# Patient Record
Sex: MALE | Race: WHITE | NOT HISPANIC OR LATINO | Employment: UNEMPLOYED | URBAN - METROPOLITAN AREA
[De-identification: names, ages, dates, MRNs, and addresses within clinical notes are randomized per-mention and may not be internally consistent; named-entity substitution may affect disease eponyms.]

---
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
## 2020-09-21 ENCOUNTER — TRANSFERRED RECORDS (OUTPATIENT)
Dept: HEALTH INFORMATION MANAGEMENT | Facility: CLINIC | Age: 20
End: 2020-09-21

## 2021-08-16 ENCOUNTER — TRANSFERRED RECORDS (OUTPATIENT)
Dept: HEALTH INFORMATION MANAGEMENT | Facility: CLINIC | Age: 21
End: 2021-08-16

## 2021-08-16 LAB
ALT SERPL-CCNC: 35 U/L (ref 0–55)
AST SERPL-CCNC: 26 U/L (ref 5–34)
CREATININE (EXTERNAL): 1.09 MG/DL (ref 0.57–1.11)
GLUCOSE (EXTERNAL): 91 MG/DL (ref 74–100)
HBA1C MFR BLD: 4.9 % (ref 4.2–6.3)

## 2022-09-19 ENCOUNTER — TRANSFERRED RECORDS (OUTPATIENT)
Dept: PULMONOLOGY | Facility: CLINIC | Age: 22
End: 2022-09-19

## 2022-09-22 ENCOUNTER — LAB (OUTPATIENT)
Dept: LAB | Facility: CLINIC | Age: 22
End: 2022-09-22
Payer: COMMERCIAL

## 2022-09-22 ENCOUNTER — CLINICAL UPDATE (OUTPATIENT)
Dept: PHARMACY | Facility: CLINIC | Age: 22
End: 2022-09-22
Payer: COMMERCIAL

## 2022-09-22 ENCOUNTER — OFFICE VISIT (OUTPATIENT)
Dept: PULMONOLOGY | Facility: CLINIC | Age: 22
End: 2022-09-22
Attending: PHYSICIAN ASSISTANT
Payer: COMMERCIAL

## 2022-09-22 VITALS
DIASTOLIC BLOOD PRESSURE: 75 MMHG | BODY MASS INDEX: 27.52 KG/M2 | WEIGHT: 192.24 LBS | OXYGEN SATURATION: 93 % | SYSTOLIC BLOOD PRESSURE: 128 MMHG | HEIGHT: 70 IN | HEART RATE: 61 BPM

## 2022-09-22 DIAGNOSIS — E84.9 CYSTIC FIBROSIS EXACERBATION (H): ICD-10-CM

## 2022-09-22 DIAGNOSIS — E84.9 CF (CYSTIC FIBROSIS) (H): Primary | ICD-10-CM

## 2022-09-22 DIAGNOSIS — E84.0 CYSTIC FIBROSIS OF THE LUNG (H): Primary | ICD-10-CM

## 2022-09-22 DIAGNOSIS — E84.9 CF (CYSTIC FIBROSIS) (H): ICD-10-CM

## 2022-09-22 LAB
ALBUMIN SERPL BCG-MCNC: 4.8 G/DL (ref 3.5–5.2)
ALP SERPL-CCNC: 100 U/L (ref 40–129)
ALT SERPL W P-5'-P-CCNC: 170 U/L (ref 10–50)
AST SERPL W P-5'-P-CCNC: 71 U/L (ref 10–50)
BILIRUB DIRECT SERPL-MCNC: 0.25 MG/DL (ref 0–0.3)
BILIRUB SERPL-MCNC: 0.9 MG/DL
CK SERPL-CCNC: 152 U/L (ref 39–308)
EXPTIME-PRE: 7.09 SEC
FEF2575-%PRED-PRE: 68 %
FEF2575-PRE: 3.43 L/SEC
FEF2575-PRED: 5.02 L/SEC
FEFMAX-%PRED-PRE: 104 %
FEFMAX-PRE: 10.56 L/SEC
FEFMAX-PRED: 10.06 L/SEC
FEV1-%PRED-PRE: 96 %
FEV1-PRE: 4.49 L
FEV1FEV6-PRE: 73 %
FEV1FEV6-PRED: 84 %
FEV1FVC-PRE: 73 %
FEV1FVC-PRED: 85 %
FIFMAX-PRE: 11.17 L/SEC
FVC-%PRED-PRE: 112 %
FVC-PRE: 6.17 L
FVC-PRED: 5.48 L
PROT SERPL-MCNC: 7.5 G/DL (ref 6.4–8.3)

## 2022-09-22 PROCEDURE — 80076 HEPATIC FUNCTION PANEL: CPT | Performed by: PATHOLOGY

## 2022-09-22 PROCEDURE — 87077 CULTURE AEROBIC IDENTIFY: CPT | Performed by: INTERNAL MEDICINE

## 2022-09-22 PROCEDURE — 99207 PR NO CHARGE LOS: CPT | Performed by: PHARMACIST

## 2022-09-22 PROCEDURE — 99204 OFFICE O/P NEW MOD 45 MIN: CPT | Mod: 25 | Performed by: INTERNAL MEDICINE

## 2022-09-22 PROCEDURE — U0005 INFEC AGEN DETEC AMPLI PROBE: HCPCS | Mod: 90 | Performed by: PATHOLOGY

## 2022-09-22 PROCEDURE — 99000 SPECIMEN HANDLING OFFICE-LAB: CPT | Performed by: PATHOLOGY

## 2022-09-22 PROCEDURE — 87206 SMEAR FLUORESCENT/ACID STAI: CPT | Performed by: INTERNAL MEDICINE

## 2022-09-22 PROCEDURE — 82550 ASSAY OF CK (CPK): CPT | Performed by: PATHOLOGY

## 2022-09-22 PROCEDURE — 94375 RESPIRATORY FLOW VOLUME LOOP: CPT | Performed by: INTERNAL MEDICINE

## 2022-09-22 PROCEDURE — 36415 COLL VENOUS BLD VENIPUNCTURE: CPT | Performed by: PATHOLOGY

## 2022-09-22 PROCEDURE — U0003 INFECTIOUS AGENT DETECTION BY NUCLEIC ACID (DNA OR RNA); SEVERE ACUTE RESPIRATORY SYNDROME CORONAVIRUS 2 (SARS-COV-2) (CORONAVIRUS DISEASE [COVID-19]), AMPLIFIED PROBE TECHNIQUE, MAKING USE OF HIGH THROUGHPUT TECHNOLOGIES AS DESCRIBED BY CMS-2020-01-R: HCPCS | Mod: 90 | Performed by: PATHOLOGY

## 2022-09-22 PROCEDURE — G0463 HOSPITAL OUTPT CLINIC VISIT: HCPCS | Mod: 25

## 2022-09-22 RX ORDER — ELEXACAFTOR, TEZACAFTOR, AND IVACAFTOR 100-50-75
KIT ORAL
COMMUNITY
Start: 2021-11-15 | End: 2023-12-29

## 2022-09-22 ASSESSMENT — PAIN SCALES - GENERAL: PAINLEVEL: NO PAIN (0)

## 2022-09-22 NOTE — PATIENT INSTRUCTIONS
Cystic Fibrosis Self-Care Plan    RECOMMENDATIONS:   Help us provide the best possible care. If you receive a questionnaire from the CF Foundation about your clinic experience today please fill it out.  It should take less than 5 minutes. Let us know what we are doing well and how we can improve.    We will check a sputum culture today and call you next week with antibiotics.  Labs today.  Once we review your labs we will order your Trikafta.  Continue vest therapy twice daily until you are feeling better then is okay to reduce to once daily.  Continue albuterol and Mucomyst.  Reduce alcohol to no more than 1 drink per day.  We will see you in 3 months with annual studies including labs, x-ray and glucose tolerance test.        [facility] Nurse line:  Kirk Cisneros  [phone]     [facility] Fax Number:      [phone]         Cystic Fibrosis Respiratory Therapists:   Raya Carter                          [phone]          Rossy Dodson   [phone]  Cystic Fibrosis Dietitians:              Tania Obregon              [phone]                            Estee Skinner                        [phone]   Cystic Fibrosis Diabetes Nurse:    Nessa Johnson               [phone]    Cystic Fibrosis Social Workers:     Joann Thompson               [phone]                     Enma Moore               [phone]  Cystic Fibrosis Pharmacists:           Soco Calderon                              [phone] (pager)         Krupa Maradiaga      [phone]   Cystic Fibrosis Genetic Counselor:   Smita Chou    [phone]    [facility] website:  [email or web address]    COVID VACCINES:    You are eligible for the COVID-19 vaccine. Sign up for your COVID vaccine via [employer]. Log in, select the menu bar, select schedule an appointment, and then select COVID-19 Vaccine 1st Dose. You may also schedule by calling this number [phone]  however hold times can be long.       OR schedule through the [facility] Vaccine Connector at https://vaccineconnector.[place].gov/ or by calling [phone].      The best vaccine is the one that s available to you first.  All COVID-19 vaccines currently available in the [place] (Tunde [employer] Tunde, Pfizer and Moderna) have been shown to be highly effect at preventing COVID-19.       We re still learning how vaccines will affect the spread of COVID-19. After you ve been fully vaccinated against COVID-19, you should keep taking precautions in public places like wearing a mask, staying 6 feet apart from others, and avoiding crowds and poorly ventilated spaces until we know more.       MRN: [id number]   Clinic Date: September 22, 2022   Patient: Andrea Mederos     Annual Studies:   IGG   Date Value Ref Range Status   10/28/2013 788 695 - 1,620 mg/dL Final     Insulin   Date Value Ref Range Status   10/28/2013 81 mU/L Final     Comment:     Reference Range:  0-20     There are no preventive care reminders to display for this patient.    Pulmonary Function Tests  FEV1: amount of air you can blow out in 1 second  FVC: total amount of air you can take in and blow out    Your Goals:         PFT Latest Ref Rng & Units 9/22/2022   FVC L 6.17   FEV1 L 4.49   FVC% % 112   FEV1% % 96          Airway Clearance: The Most Important Way to Keep Your Lungs Healthy  Vest Settings:   Hill-Rom Frequencies: 8, 9, 10 Pressure 10 Then, Frequencies 18, 19, 20 Pressure 6     RespirTech: Quick Start with Pressure of     Do each frequency for 5 minutes; Deflate vest after each frequency & cough 3 times before beginning the next setting.    Vest and Neb Therapy should be done 2 times/day.    Good Nutrition Can Improve Lung Function and Overall Health    Take ALL of your vitamins with food    Take 1/2 of your enzymes before EVERY meal/snack and the other 1/2 mid-meal/snack    Wt Readings from Last 3 Encounters:    09/22/22 87.2 kg (192 lb 3.9 oz)   04/15/14 50.4 kg (111 lb 1.8 oz) (59 %, Z= 0.22)*   12/06/13 47.6 kg (105 lb) (55 %, Z= 0.14)*     * Growth percentiles are based on [facility] (Boys, 2-20 Years) data.       Body mass index is 27.83 kg/m .         National CF Foundation Recommendations for BMI in CF Adults: Women: at least 22 Men: at least 23        Controlling Blood Sugars Helps Prevent Lung Infections & Improves Nutrition  Test blood sugar:    In the morning before eating (goal is )    2 hours after a meal (goal is less than 150)    When pre-meal glucose is greater than 150 add correction    At bedtime (if less than 100 eat a snack with 15 grams of carbohydrates  Last A1C Results:   Hemoglobin A1C   Date Value Ref Range Status   10/28/2013 5.2 4.3 - 6.0 % Final         If diabetic, measure A1C every 6 months. Goal: Under 7%    Staying Healthy  Research:  If you are interested in learning about research opportunities or have questions, please contact the CF Research Team at [phone] or [email or web address].    CF Foundation:  Compass is a personalized resource service to help you with the insurance, financial, legal and other issues you are facing.  It's free, confidential and available to anyone with CF.  Ask your  for more information or contact Compass directly at [phone]-[phone]) or compass@cff.org, or learn more at cff.org/compass.

## 2022-09-22 NOTE — NURSING NOTE
"Andrea Mederos is a 21 year old year old who is being seen for Cystic Fibrosis (New CF )      Medications reviewed and Vital signs taken.    Specimen Collection Type: Sputum    Order(s) placed: AFB (Acid Fast Bacilli) and CF Aerobic Bacterial    *IF AFB order placed - please enter \"PRIORITIZE AFB\" to order comments.       No results found for: ACIDFAST      No results found for: AFBSMS                Vitals were taken and medications were reconciled.     Beatriz Meade RMA  1:56 PM          "

## 2022-09-22 NOTE — LETTER
9/22/2022         RE: Andrea Mederos  [address] [place] [address]        Dear Colleague,    Thank you for referring your patient, Andrea Mederos, to the [facility] FOR LUNG SCIENCE AND [facility]. Please see a copy of my visit note below.    [facility]   CF Clinic New Patient     Andrea Mederos MRN: [id number]  Date of Service: 09/22/2022    HPI:  Andrea Mederos is a 21M with CF here to establish care. Transferring from: [facility]. Previously seen by Dr. Rajni Camarena, last clinic visit 8/16/21.     Today:   - URI symptoms 3 weeks ago. Cough, sneezing, rhinorrhea, sinus pressure, malaise, myalgia. No fevers or chills. Sx improving but still with white-yellow productive cough - worst in the morning and improves in the afternoon. Increased vest therapy to BID during this period. Also with belly pain and diarrhea but now resolved. +Sick contact (friend) with similar symptoms.    - Covid 19 winter 2021. Did not require hospitalization. Had temperatures of 104F. Main sx myalgia and HA, no difficult breathing.     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 Stools vary from 1 to 3x/day, can be foremd or loose depending on what he eats (greasy foods, beer). Good appetite.   Activity: Walk, bikes 5-10 miles couple times a week    Maintenance   Modulator: Trikafta started 3/2020. Has improved FEV1 and weight. Stopped 6/2021 because ran out of refills, did not notice any difference off the medication. Was restarted  "8/2021, self ND'ed 1 month ago (running low/almost out). Poor compliance, 4-5 times weekly.   AW Clearance: Vest therapy 1x/day. Sometimes brings up phlegm  Bronchodilators: Albuterol neb 1x/day with vest therapy + QID prn   Mucolytics: None  Antibiotics Inh: None currently. Previously on UMAIR as a teen every other month   Antibiotics Oral: None  Vitamins: Infrequently      ROS: Denies fevers, chills, CP, SOB, abdominal pain, HA. 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   - Tobacco: Vapes THC and nicotine >10 times daily   - EtOH: 4 drinks daily   - Illicits: Smokes cannabis     Outpatient Medications: All listed under maintenance as above. No antihistamines or OTC vitamins.     Allergies:   Seasonal     Family Hx:   Cancer maternal side   Brother has CF      Physical Exam:   /75 (BP Location: Right arm, Patient Position: Chair, Cuff Size: Adult Regular)   Pulse 61   Ht 1.77 m (5' 9.69\")   Wt 87.2 kg (192 lb 3.9 oz)   SpO2 93%   BMI 27.83 kg/m    - Gen: Aox3, NAD   - HEENT: No LN. TM opaque. 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 Overall healthy, no h/o frequent CF exacerbations, " hospitalizations or need for abx (last time ~7 years ago). Was previously on UMAIR nebs as a teenager but not longer needed  - Symptoms minimal. At baseline no productive cough or sinus issues. Currently with some lingering post viral URI symptoms, notably productive cough and malaise. Pt states the cold is lasting longer than normal and would like some abx    - Check Covid PCR    - Sputum culture collected. Will wait for speciation prior to starting abx   - Poor compliance with Trikafta. Started in 3/2020 however takes inconsistently (couple of times a week) with stretches of time off the medication. Pt doesn't notice a difference off the medication. Reiterated importance of medication adherence and long term benefits. Will resume Trikafta pending LFT's   - FEV1 slightly improved compared to 8/2021, no pulmonary concerns   - Vest therapy daily + Albuterol nebs daily. Currently not on inhaled abx or mucolytic    # EtOH and Tobacco Use  - Counseled patient on cutting back      # Maintenance   - Immunization: Up to date including Covid vaccine. Needs annual flu shot   - Annual labs + CXR + Dexa next visit     RTC 3 months     Patient seen and discussed with Dr. Eleanor Dunn MD   Pulmonary and Critical Care PGY4        Attestation signed by Rock Webster MD at 9/25/2022 11:52 AM:  Attending attestation: I, Rock Webster M.D., have seen and examined the patient with Dr. Eli Dunn MD . I have reviewed labs and radiographs. We have discussed the patient and I agree with the findings and plan of care as discussed below.    Andrea Mederos is a 21-year-old with cystic fibrosis with mild obstructive lung disease.  This is his first visit to the adult CF program with [facility].  He was previously cared for at [facility] but his last visit was more than a year ago.  He was originally diagnosed at age 5, evaluated due to GI symptoms.  He has been hospitalized  only once.  He has rare exacerbations, last required antibiotics many years ago.  Previous cultures with MSSA and Pseudomonas.  He has pancreatic sufficient and has a history of CF related sinus disease.  Breathing is comfortable at rest and with all usual activity.  He bikes daily.  Generally rare cough with minimal sputum but has had increased cough for the past couple weeks productive of white-yellow sputum.  Also has noted some body aches and sinus pain.  He had rhinorrhea but it is improving.  He was having difficulty sleeping and headache but this is resolved.  No fever, chills or night sweats.  Body aches have resolved.  Vest therapy once or twice daily, generally twice.  He did have some nausea and vomiting due to increased cough and sputum last week.  This has since resolved.  He started Trikafta in March 2020 but has not had it for least the past month.  He did have a COVID infection last winter.  Nasal mucosal edema.  Lungs are clear with good airflow, no crackles, wheezes or rhonchi.  Heart sounds are normal.  Abdomen is soft and nontender.      Recent Results (from the past 168 hour(s))   General PFT Lab (Please always keep checked)    Collection Time: 09/22/22  1:30 PM   Result Value Ref Range    FVC-Pred 5.48 L    FVC-Pre 6.17 L    FVC-%Pred-Pre 112 %    FEV1-Pre 4.49 L    FEV1-%Pred-Pre 96 %    FEV1FVC-Pred 85 %    FEV1FVC-Pre 73 %    FEFMax-Pred 10.06 L/sec    FEFMax-Pre 10.56 L/sec    FEFMax-%Pred-Pre 104 %    FEF2575-Pred 5.02 L/sec    FEF2575-Pre 3.43 L/sec    BUO1560-%Pred-Pre 68 %    ExpTime-Pre 7.09 sec    FIFMax-Pre 11.17 L/sec    FEV1FEV6-Pred 84 %    FEV1FEV6-Pre 73 %   Cystic Fibrosis Culture Aerobic Bacterial    Collection Time: 09/22/22  3:36 PM    Specimen: Expectorate; Sputum   Result Value Ref Range    Culture Culture in progress     Culture 4+ Normal kit     Culture 2+ Staphylococcus aureus (A)     Culture 1+ Klebsiella pneumoniae (A)    Acid-Fast Bacilli Culture and Stain     Collection Time: 09/22/22  3:37 PM    Specimen: Expectorate; Sputum   Result Value Ref Range    Acid Fast Stain No acid fast bacilli seen    Hepatic function panel    Collection Time: 09/22/22  4:04 PM   Result Value Ref Range    Protein Total 7.5 6.4 - 8.3 g/dL    Albumin 4.8 3.5 - 5.2 g/dL    Bilirubin Total 0.9 <=1.2 mg/dL    Alkaline Phosphatase 100 40 - 129 U/L    AST 71 (H) 10 - 50 U/L     (H) 10 - 50 U/L    Bilirubin Direct 0.25 0.00 - 0.30 mg/dL   CK total    Collection Time: 09/22/22  4:04 PM   Result Value Ref Range     39 - 308 U/L   Symptomatic; Yes; 9/1/2022 COVID-19 Virus (Coronavirus) by PCR Nasopharyngeal    Collection Time: 09/22/22  4:50 PM    Specimen: Nasopharyngeal; Swab   Result Value Ref Range    SARS CoV2 PCR Negative Negative       Results as noted above.    PFT Interpretation:  Very mld obstructive ventilatory defect.  No previous studies for comparison at this  institution.  Valid Maneuver    Pulmonary exacerbation: mild: Increased vest/bronchodilator/execise. Antibiotic TBD based on culture results.    Andrea Mederos is a 21-year-old with cystic fibrosis with mild lung disease.  This is his first visit to the adult CF program diversity [place].  He presents with persistent cough and sputum production after a recent probable viral URI.  Symptoms suggest a mild CF pulmonary exacerbation following a viral illness.  No recent cultures are available.  Sputum culture will be obtained today and antibiotics will be initiated based on the culture results.  Patient was encouraged to do vest therapy twice daily on a consistent basis.  With the ongoing fatigue, COVID will be checked.  The patient was previously on Trikafta.  He has not had any for the past month.  LFTs and CK will be checked to determine safety of reinitiating Trikafta.  Patient reports drinking 4 alcoholic beverages per day.  He was encouraged to cut down to 1 or less per day to limit hepatic injury to permit the  use of Trikafta.  The patient is also vaping and he was encouraged to reduce or quit this as well.  This will be addressed with ongoing visits.  Follow-up in 3 months with full annual studies.  Rock Webster MD       Again, thank you for allowing me to participate in the care of your patient.        Sincerely,        Eli Dunn MD

## 2022-09-22 NOTE — PROGRESS NOTES
[facility]   CF Clinic New Patient     Andrea Mederos MRN: [id number]  Date of Service: 09/22/2022    HPI:  Andrea Mederos is a 21M with CF here to establish care. Transferring from: [facility]. Previously seen by Dr. Rajni Camarena, last clinic visit 8/16/21.     Today:   - URI symptoms 3 weeks ago. Cough, sneezing, rhinorrhea, sinus pressure, malaise, myalgia. No fevers or chills. Sx improving but still with white-yellow productive cough - worst in the morning and improves in the afternoon. Increased vest therapy to BID during this period. Also with belly pain and diarrhea but now resolved. +Sick contact (friend) with similar symptoms.    - Covid 19 winter 2021. Did not require hospitalization. Had temperatures of 104F. Main sx myalgia and HA, no difficult breathing.     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 Stools vary from 1 to 3x/day, can be foremd or loose depending on what he eats (greasy foods, beer). Good appetite.   Activity: Walk, bikes 5-10 miles couple times a week    Maintenance   Modulator: Trikafta started 3/2020. Has improved FEV1 and weight. Stopped 6/2021 because ran out of refills, did not notice any difference off the medication. Was restarted 8/2021, self dc'ed 1 month ago (running low/almost out). Poor compliance, 4-5 times weekly.   AW Clearance: Vest therapy 1x/day. Sometimes brings up phlegm  Bronchodilators: Albuterol neb 1x/day with vest therapy + QID prn   Mucolytics: None  Antibiotics Inh: None currently. Previously on  "UMAIR as a teen every other month   Antibiotics Oral: None  Vitamins: Infrequently      ROS: Denies fevers, chills, CP, SOB, abdominal pain, HA. 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   - Tobacco: Vapes THC and nicotine >10 times daily   - EtOH: 4 drinks daily   - Illicits: Smokes cannabis     Outpatient Medications: All listed under maintenance as above. No antihistamines or OTC vitamins.     Allergies:   Seasonal     Family Hx:   Cancer maternal side   Brother has CF      Physical Exam:   /75 (BP Location: Right arm, Patient Position: Chair, Cuff Size: Adult Regular)   Pulse 61   Ht 1.77 m (5' 9.69\")   Wt 87.2 kg (192 lb 3.9 oz)   SpO2 93%   BMI 27.83 kg/m    - Gen: Aox3, NAD   - HEENT: No LN. TM opaque. 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 Overall healthy, no h/o frequent CF exacerbations, hospitalizations or need for abx (last time ~7 years ago). Was previously on UMAIR nebs as a teenager but not longer needed  - Symptoms minimal. At baseline no productive cough or sinus issues. Currently with some lingering post viral URI symptoms, notably productive cough and malaise. Pt states the " cold is lasting longer than normal and would like some abx    - Check Covid PCR    - Sputum culture collected. Will wait for speciation prior to starting abx   - Poor compliance with Trikafta. Started in 3/2020 however takes inconsistently (couple of times a week) with stretches of time off the medication. Pt doesn't notice a difference off the medication. Reiterated importance of medication adherence and long term benefits. Will resume Trikafta pending LFT's   - FEV1 slightly improved compared to 8/2021, no pulmonary concerns   - Vest therapy daily + Albuterol nebs daily. Currently not on inhaled abx or mucolytic    # EtOH and Tobacco Use  - Counseled patient on cutting back      # Maintenance   - Immunization: Up to date including Covid vaccine. Needs annual flu shot   - Annual labs + CXR + Dexa next visit     RTC 3 months     Patient seen and discussed with Dr. Eleanor Dunn MD   Pulmonary and Critical Care PGY4

## 2022-09-22 NOTE — PROGRESS NOTES
Clinical Update:                                                    At the request of Dr. Webster, a chart review was conducted for Andrea Mederos.    Reason for Chart Review: Trikafta Lab Monitoring     Discussion: Andrea has been on Trikafta since 3/2020. Andrea has been off of Trikafta for the past month. He also report increased alcohol use (~4 drinks per day) which has prompted additional monitoring.    Labs were reviewed from 9/22/2022 at [facility] . ALT is elevated at 3.4 x the upper limit of normal and AST is elevated at 1.4 x the upper limit of normal. All other labs are within normal limits.    Lab Results   Component Value Date     (H) 09/22/2022    AST 71 (H) 09/22/2022    BILITOTAL 0.9 09/22/2022    DBIL 0.25 09/22/2022     09/22/2022         Plan:  1. Will continue hold on Trikafta due to LFT elevation . Unable to reach Andrea, left message  2. Recheck hepatic panel and CK in 2 weeks        Krupa Maradiaga, PharmD  Cystic Fibrosis MTM Pharmacist  [facility]  Voicemail: [phone]

## 2022-09-23 DIAGNOSIS — E84.0 CYSTIC FIBROSIS WITH PULMONARY MANIFESTATIONS (H): Primary | ICD-10-CM

## 2022-09-23 LAB — SARS-COV-2 RNA RESP QL NAA+PROBE: NEGATIVE

## 2022-09-29 ENCOUNTER — TELEPHONE (OUTPATIENT)
Dept: PULMONOLOGY | Facility: CLINIC | Age: 22
End: 2022-09-29

## 2022-09-29 DIAGNOSIS — E84.0 CYSTIC FIBROSIS WITH PULMONARY MANIFESTATIONS (H): Primary | ICD-10-CM

## 2022-09-29 DIAGNOSIS — E84.9 CF (CYSTIC FIBROSIS) (H): ICD-10-CM

## 2022-09-29 DIAGNOSIS — E84.9 CF (CYSTIC FIBROSIS) (H): Primary | ICD-10-CM

## 2022-09-29 RX ORDER — SULFAMETHOXAZOLE/TRIMETHOPRIM 800-160 MG
2 TABLET ORAL 2 TIMES DAILY
Qty: 84 TABLET | Refills: 0 | Status: SHIPPED | OUTPATIENT
Start: 2022-09-29 | End: 2022-10-20

## 2022-09-30 NOTE — TELEPHONE ENCOUNTER
Patient seen in clinic last week. Respiratory symptoms follow URI. Plan was to review what organisms grew and treat. Patient grew MSSA and Klebsiella pneumoniae.    Reviewed with Dr. Webster:    Plan to treat with Bactrim DS 2 tabs BID x 21 days  Stay well hydrated and take with food.  Continue to increase vesting to BID  Call if symptoms don't improve or worsen  RN added sensitivities on for Klebsiella pneumoniae    JUAN R Roman

## 2022-10-02 LAB
BACTERIA SPT CULT: ABNORMAL

## 2022-10-04 ENCOUNTER — TELEPHONE (OUTPATIENT)
Dept: PHARMACY | Facility: CLINIC | Age: 22
End: 2022-10-04

## 2022-10-04 NOTE — TELEPHONE ENCOUNTER
Second call placed to Andrea, recommend repeat labs in order to restart Trikafta. Elevated at last visit. Requested callback to review how to schedule lab only appointment.    Left message.    Krupa Maradiaga, PharmD  Cystic Fibrosis MTM Pharmacist  [facility]  Voicemail: [phone]

## 2022-11-11 DIAGNOSIS — E84.9 CF (CYSTIC FIBROSIS) (H): Primary | ICD-10-CM

## 2022-11-11 RX ORDER — SODIUM CHLORIDE FOR INHALATION 0.9 %
3 VIAL, NEBULIZER (ML) INHALATION 2 TIMES DAILY
Qty: 180 ML | Refills: 3 | Status: SHIPPED | OUTPATIENT
Start: 2022-11-11 | End: 2023-01-10

## 2022-11-11 NOTE — PROGRESS NOTES
Father in clinic. Reports patient is needing refill of Sodium Chloride (same as brother). Per chart review, brother not prescribed hypertonic. Father reports patient uses nebulizer twice daily. Reviewed with RT Rossy and sent prescription to [facility] per father's request.    JUAN R Roman

## 2022-11-18 LAB
ACID FAST STAIN (ARUP): NORMAL
ACID FAST STAIN (ARUP): NORMAL

## 2023-01-10 DIAGNOSIS — E84.9 CF (CYSTIC FIBROSIS) (H): ICD-10-CM

## 2023-01-10 RX ORDER — ALBUTEROL SULFATE 0.83 MG/ML
SOLUTION RESPIRATORY (INHALATION)
Qty: 540 ML | Refills: 3 | Status: SHIPPED | OUTPATIENT
Start: 2023-01-10 | End: 2023-10-23

## 2023-01-10 RX ORDER — SODIUM CHLORIDE FOR INHALATION 0.9 %
3 VIAL, NEBULIZER (ML) INHALATION 2 TIMES DAILY
Qty: 180 ML | Refills: 3 | Status: SHIPPED | OUTPATIENT
Start: 2023-01-10 | End: 2023-01-17

## 2023-01-17 DIAGNOSIS — E84.9 CF (CYSTIC FIBROSIS) (H): ICD-10-CM

## 2023-01-17 RX ORDER — SODIUM CHLORIDE FOR INHALATION 0.9 %
VIAL, NEBULIZER (ML) INHALATION
Qty: 600 ML | Refills: 1 | Status: SHIPPED | OUTPATIENT
Start: 2023-01-17 | End: 2023-10-23

## 2023-04-10 ENCOUNTER — TELEPHONE (OUTPATIENT)
Dept: PULMONOLOGY | Facility: CLINIC | Age: 23
End: 2023-04-10
Payer: COMMERCIAL

## 2023-04-10 NOTE — TELEPHONE ENCOUNTER
Contacted patient to assist with schedule appointment as patient has been lost to follow up. Patient reported that he started a new job and is unsure when he will be able to come in to clinic. Provided patient with phone number for . Instructed patient to call to schedule. Patient reported he is out of trikafta and requested a refill. Notified patient that he will need to schedule an appointment to get Trikafta refill. Patient verbalized understanding and stated he would call  to set up appointment.    Sophie Robles RN

## 2023-10-23 ENCOUNTER — TELEPHONE (OUTPATIENT)
Dept: PULMONOLOGY | Facility: CLINIC | Age: 23
End: 2023-10-23
Payer: COMMERCIAL

## 2023-10-23 DIAGNOSIS — E84.9 CF (CYSTIC FIBROSIS) (H): ICD-10-CM

## 2023-10-23 RX ORDER — SODIUM CHLORIDE FOR INHALATION 0.9 %
VIAL, NEBULIZER (ML) INHALATION
Qty: 180 ML | Refills: 0 | Status: SHIPPED | OUTPATIENT
Start: 2023-10-23 | End: 2023-10-26

## 2023-10-23 RX ORDER — ALBUTEROL SULFATE 0.83 MG/ML
SOLUTION RESPIRATORY (INHALATION)
Qty: 540 ML | Refills: 3 | Status: SHIPPED | OUTPATIENT
Start: 2023-10-23 | End: 2023-10-26

## 2023-10-23 NOTE — TELEPHONE ENCOUNTER
Mom called requesting prescription refill for nebs. Notified mom that patient has not been seen in over a year, needs an appointment to be able to provide bridge until appointment. Appt scheduled for 11/10, one month supply sent. More refills to be sent following appointment.     Sophie Robles RN

## 2023-10-25 ENCOUNTER — TELEPHONE (OUTPATIENT)
Dept: PULMONOLOGY | Facility: CLINIC | Age: 23
End: 2023-10-25
Payer: COMMERCIAL

## 2023-10-25 NOTE — TELEPHONE ENCOUNTER
[facility] Call Center    Phone Message    May a detailed message be left on voicemail: yes     Reason for Call: Medication Question or concern regarding medication   Prescription Clarification  Name of Medication: sodium chloride 0.9 % neb solution   Prescribing Provider:    Pharmacy: [facility] - [place], [address]    What on the order needs clarification?     Per pharmacy would like to know what size vial for medication. Please call back at   [phone] REF# [id number]    Action Taken: Message routed to:  [facility] ([facility]): PULM    Travel Screening: Not Applicable

## 2023-10-25 NOTE — TELEPHONE ENCOUNTER
RN returned call to pharmacy to confirm size of vial (3mL). 30 day supply until patient is seen for scheduled visit.     JUAN R Roman

## 2023-10-26 RX ORDER — ALBUTEROL SULFATE 0.83 MG/ML
SOLUTION RESPIRATORY (INHALATION)
Qty: 540 ML | Refills: 3 | Status: SHIPPED | OUTPATIENT
Start: 2023-10-26

## 2023-10-26 RX ORDER — SODIUM CHLORIDE FOR INHALATION 0.9 %
VIAL, NEBULIZER (ML) INHALATION
Qty: 180 ML | Refills: 0 | Status: SHIPPED | OUTPATIENT
Start: 2023-10-26 | End: 2024-04-24

## 2023-10-26 NOTE — TELEPHONE ENCOUNTER
Received call from mom stating that medications need to go to [facility] in [place]. Routed meds there per family/insurance preference.    Sophie Robles RN

## 2023-12-29 ENCOUNTER — OFFICE VISIT (OUTPATIENT)
Dept: PULMONOLOGY | Facility: CLINIC | Age: 23
End: 2023-12-29
Attending: INTERNAL MEDICINE
Payer: COMMERCIAL

## 2023-12-29 ENCOUNTER — LAB (OUTPATIENT)
Dept: LAB | Facility: CLINIC | Age: 23
End: 2023-12-29
Payer: COMMERCIAL

## 2023-12-29 VITALS
DIASTOLIC BLOOD PRESSURE: 80 MMHG | HEIGHT: 70 IN | OXYGEN SATURATION: 97 % | SYSTOLIC BLOOD PRESSURE: 135 MMHG | BODY MASS INDEX: 27.3 KG/M2 | WEIGHT: 190.7 LBS | HEART RATE: 63 BPM

## 2023-12-29 DIAGNOSIS — Z23 NEED FOR INFLUENZA VACCINATION: ICD-10-CM

## 2023-12-29 DIAGNOSIS — E84.9 CF (CYSTIC FIBROSIS) (H): Primary | ICD-10-CM

## 2023-12-29 DIAGNOSIS — E84.9 CYSTIC FIBROSIS (H): ICD-10-CM

## 2023-12-29 DIAGNOSIS — E84.9 CYSTIC FIBROSIS (H): Primary | ICD-10-CM

## 2023-12-29 LAB
ALBUMIN SERPL BCG-MCNC: 4.9 G/DL (ref 3.5–5.2)
ALBUMIN UR-MCNC: NEGATIVE MG/DL
ALP SERPL-CCNC: 100 U/L (ref 40–150)
ALT SERPL W P-5'-P-CCNC: 166 U/L (ref 0–70)
ANION GAP SERPL CALCULATED.3IONS-SCNC: 11 MMOL/L (ref 7–15)
APPEARANCE UR: CLEAR
AST SERPL W P-5'-P-CCNC: 80 U/L (ref 0–45)
BASOPHILS # BLD AUTO: 0 10E3/UL (ref 0–0.2)
BASOPHILS NFR BLD AUTO: 0 %
BILIRUB UR QL STRIP: NEGATIVE
BUN SERPL-MCNC: 12.3 MG/DL (ref 6–20)
CALCIUM SERPL-MCNC: 9.8 MG/DL (ref 8.6–10)
CHLORIDE SERPL-SCNC: 104 MMOL/L (ref 98–107)
CHOLEST SERPL-MCNC: 240 MG/DL
COLOR UR AUTO: YELLOW
CREAT SERPL-MCNC: 1.04 MG/DL (ref 0.67–1.17)
CREAT UR-MCNC: 211 MG/DL
DEPRECATED HCO3 PLAS-SCNC: 26 MMOL/L (ref 22–29)
EGFRCR SERPLBLD CKD-EPI 2021: >90 ML/MIN/1.73M2
EOSINOPHIL # BLD AUTO: 0.1 10E3/UL (ref 0–0.7)
EOSINOPHIL NFR BLD AUTO: 1 %
ERYTHROCYTE [DISTWIDTH] IN BLOOD BY AUTOMATED COUNT: 11.9 % (ref 10–15)
ERYTHROCYTE [SEDIMENTATION RATE] IN BLOOD BY WESTERGREN METHOD: 4 MM/HR (ref 0–15)
EXPTIME-PRE: 8.04 SEC
FASTING STATUS PATIENT QL REPORTED: NO
FEF2575-%PRED-PRE: 87 %
FEF2575-PRE: 4.08 L/SEC
FEF2575-PRED: 4.69 L/SEC
FEFMAX-%PRED-PRE: 111 %
FEFMAX-PRE: 11.28 L/SEC
FEFMAX-PRED: 10.14 L/SEC
FEV1-%PRED-PRE: 108 %
FEV1-PRE: 4.72 L
FEV1FEV6-PRE: 78 %
FEV1FEV6-PRED: 84 %
FEV1FVC-PRE: 78 %
FEV1FVC-PRED: 86 %
FIFMAX-PRE: 6.49 L/SEC
FVC-%PRED-PRE: 119 %
FVC-PRE: 6.08 L
FVC-PRED: 5.09 L
GGT SERPL-CCNC: 222 U/L (ref 8–61)
GLUCOSE SERPL-MCNC: 85 MG/DL (ref 70–99)
GLUCOSE UR STRIP-MCNC: NEGATIVE MG/DL
HBA1C MFR BLD: 5.1 %
HCT VFR BLD AUTO: 48.4 % (ref 40–53)
HDLC SERPL-MCNC: 49 MG/DL
HGB BLD-MCNC: 17.6 G/DL (ref 13.3–17.7)
HGB UR QL STRIP: NEGATIVE
IMM GRANULOCYTES # BLD: 0 10E3/UL
IMM GRANULOCYTES NFR BLD: 0 %
INR PPP: 1.03 (ref 0.85–1.15)
IRON SERPL-MCNC: 161 UG/DL (ref 61–157)
KETONES UR STRIP-MCNC: NEGATIVE MG/DL
LDLC SERPL CALC-MCNC: 151 MG/DL
LEUKOCYTE ESTERASE UR QL STRIP: NEGATIVE
LYMPHOCYTES # BLD AUTO: 1.1 10E3/UL (ref 0.8–5.3)
LYMPHOCYTES NFR BLD AUTO: 18 %
MAGNESIUM SERPL-MCNC: 2.2 MG/DL (ref 1.7–2.3)
MCH RBC QN AUTO: 33.3 PG (ref 26.5–33)
MCHC RBC AUTO-ENTMCNC: 36.4 G/DL (ref 31.5–36.5)
MCV RBC AUTO: 92 FL (ref 78–100)
MICROALBUMIN UR-MCNC: <12 MG/L
MICROALBUMIN/CREAT UR: NORMAL MG/G{CREAT}
MONOCYTES # BLD AUTO: 0.5 10E3/UL (ref 0–1.3)
MONOCYTES NFR BLD AUTO: 9 %
MUCOUS THREADS #/AREA URNS LPF: PRESENT /LPF
NEUTROPHILS # BLD AUTO: 4.5 10E3/UL (ref 1.6–8.3)
NEUTROPHILS NFR BLD AUTO: 72 %
NITRATE UR QL: NEGATIVE
NONHDLC SERPL-MCNC: 191 MG/DL
NRBC # BLD AUTO: 0 10E3/UL
NRBC BLD AUTO-RTO: 0 /100
PH UR STRIP: 7 [PH] (ref 5–7)
PHOSPHATE SERPL-MCNC: 3.1 MG/DL (ref 2.5–4.5)
PLATELET # BLD AUTO: 291 10E3/UL (ref 150–450)
POTASSIUM SERPL-SCNC: 4.3 MMOL/L (ref 3.4–5.3)
PROT SERPL-MCNC: 7.7 G/DL (ref 6.4–8.3)
RBC # BLD AUTO: 5.29 10E6/UL (ref 4.4–5.9)
RBC URINE: 1 /HPF
SODIUM SERPL-SCNC: 141 MMOL/L (ref 135–145)
SP GR UR STRIP: 1.02 (ref 1–1.03)
SQUAMOUS EPITHELIAL: <1 /HPF
TRIGL SERPL-MCNC: 201 MG/DL
TSH SERPL DL<=0.005 MIU/L-ACNC: 1.16 UIU/ML (ref 0.3–4.2)
UROBILINOGEN UR STRIP-MCNC: 4 MG/DL
VIT D+METAB SERPL-MCNC: 32 NG/ML (ref 20–50)
WBC # BLD AUTO: 6.3 10E3/UL (ref 4–11)
WBC URINE: 2 /HPF

## 2023-12-29 PROCEDURE — 94375 RESPIRATORY FLOW VOLUME LOOP: CPT | Performed by: INTERNAL MEDICINE

## 2023-12-29 PROCEDURE — 83036 HEMOGLOBIN GLYCOSYLATED A1C: CPT | Performed by: INTERNAL MEDICINE

## 2023-12-29 PROCEDURE — 82043 UR ALBUMIN QUANTITATIVE: CPT | Performed by: INTERNAL MEDICINE

## 2023-12-29 PROCEDURE — 99215 OFFICE O/P EST HI 40 MIN: CPT | Mod: 25 | Performed by: INTERNAL MEDICINE

## 2023-12-29 PROCEDURE — 87206 SMEAR FLUORESCENT/ACID STAI: CPT | Performed by: INTERNAL MEDICINE

## 2023-12-29 PROCEDURE — 83735 ASSAY OF MAGNESIUM: CPT | Performed by: PATHOLOGY

## 2023-12-29 PROCEDURE — 83540 ASSAY OF IRON: CPT | Performed by: PATHOLOGY

## 2023-12-29 PROCEDURE — 80061 LIPID PANEL: CPT | Performed by: PATHOLOGY

## 2023-12-29 PROCEDURE — 80069 RENAL FUNCTION PANEL: CPT | Performed by: PATHOLOGY

## 2023-12-29 PROCEDURE — 36415 COLL VENOUS BLD VENIPUNCTURE: CPT | Performed by: PATHOLOGY

## 2023-12-29 PROCEDURE — 99000 SPECIMEN HANDLING OFFICE-LAB: CPT | Performed by: PATHOLOGY

## 2023-12-29 PROCEDURE — 87116 MYCOBACTERIA CULTURE: CPT | Performed by: INTERNAL MEDICINE

## 2023-12-29 PROCEDURE — 84450 TRANSFERASE (AST) (SGOT): CPT | Performed by: PATHOLOGY

## 2023-12-29 PROCEDURE — 84460 ALANINE AMINO (ALT) (SGPT): CPT | Performed by: PATHOLOGY

## 2023-12-29 PROCEDURE — 90682 RIV4 VACC RECOMBINANT DNA IM: CPT | Performed by: INTERNAL MEDICINE

## 2023-12-29 PROCEDURE — 99213 OFFICE O/P EST LOW 20 MIN: CPT | Mod: 25 | Performed by: INTERNAL MEDICINE

## 2023-12-29 PROCEDURE — 85610 PROTHROMBIN TIME: CPT | Performed by: PATHOLOGY

## 2023-12-29 PROCEDURE — 84443 ASSAY THYROID STIM HORMONE: CPT | Performed by: PATHOLOGY

## 2023-12-29 PROCEDURE — 84403 ASSAY OF TOTAL TESTOSTERONE: CPT | Performed by: INTERNAL MEDICINE

## 2023-12-29 PROCEDURE — 84075 ASSAY ALKALINE PHOSPHATASE: CPT | Performed by: PATHOLOGY

## 2023-12-29 PROCEDURE — 87186 SC STD MICRODIL/AGAR DIL: CPT | Performed by: INTERNAL MEDICINE

## 2023-12-29 PROCEDURE — 81001 URINALYSIS AUTO W/SCOPE: CPT | Performed by: PATHOLOGY

## 2023-12-29 PROCEDURE — 84590 ASSAY OF VITAMIN A: CPT | Mod: 90 | Performed by: PATHOLOGY

## 2023-12-29 PROCEDURE — 82785 ASSAY OF IGE: CPT | Performed by: INTERNAL MEDICINE

## 2023-12-29 PROCEDURE — 82306 VITAMIN D 25 HYDROXY: CPT | Performed by: INTERNAL MEDICINE

## 2023-12-29 PROCEDURE — 84446 ASSAY OF VITAMIN E: CPT | Mod: 90 | Performed by: PATHOLOGY

## 2023-12-29 PROCEDURE — 250N000011 HC RX IP 250 OP 636: Performed by: INTERNAL MEDICINE

## 2023-12-29 PROCEDURE — G0008 ADMIN INFLUENZA VIRUS VAC: HCPCS | Performed by: INTERNAL MEDICINE

## 2023-12-29 PROCEDURE — 82784 ASSAY IGA/IGD/IGG/IGM EACH: CPT | Performed by: INTERNAL MEDICINE

## 2023-12-29 PROCEDURE — 82977 ASSAY OF GGT: CPT | Performed by: PATHOLOGY

## 2023-12-29 PROCEDURE — 85652 RBC SED RATE AUTOMATED: CPT | Performed by: PATHOLOGY

## 2023-12-29 PROCEDURE — 84155 ASSAY OF PROTEIN SERUM: CPT | Performed by: PATHOLOGY

## 2023-12-29 PROCEDURE — 85025 COMPLETE CBC W/AUTO DIFF WBC: CPT | Performed by: PATHOLOGY

## 2023-12-29 RX ADMIN — INFLUENZA A VIRUS A/WEST VIRGINIA/30/2022 (H1N1) RECOMBINANT HEMAGGLUTININ ANTIGEN, INFLUENZA A VIRUS A/DARWIN/6/2021 (H3N2) RECOMBINANT HEMAGGLUTININ ANTIGEN, INFLUENZA B VIRUS B/AUSTRIA/1359417/2021 RECOMBINANT HEMAGGLUTININ ANTIGEN, AND INFLUENZA B VIRUS B/PHUKET/3073/2013 RECOMBINANT HEMAGGLUTININ ANTIGEN 0.5 ML: 45; 45; 45; 45 INJECTION INTRAMUSCULAR at 14:37

## 2023-12-29 ASSESSMENT — PAIN SCALES - GENERAL: PAINLEVEL: NO PAIN (0)

## 2023-12-29 NOTE — LETTER
12/29/2023         RE: Andrea Mederos  [address]  [place] [address]        Dear Colleague,    Thank you for referring your patient, Andrea Mederos, to the [facility] [place], [place] FOR LUNG SCIENCE AND [facility]. Please see a copy of my visit note below.    [facility]   CF Clinic   Andrea Mederos MRN: [id number]  Date of Service: 12/29/2023    HPI:  Andrea Mederos is a 23M with CF here for CF follow-up.     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 Overall healthy, no h/o frequent CF exacerbations, hospitalizations or need for abx (last time ~7 years ago). Was previously on UMAIR nebs as a teenager but not longer needed  - Symptoms minimal. At baseline no productive cough, some sinus drainage. Currently feeling well without significant cough or SOB.   -He denies symptoms consistent with exocrine or endocrine pancreatic dysfunction at this time.  Would ideally monitor oral glucose tolerance test yearly and we discussed this today.  Weight has been stable and is 27.61, within CFF goal.   - FEV1 slightly improved compared to 8/2021, no pulmonary concerns   - Vest therapy daily + Albuterol nebs daily, Mucomyst, saline neb.  - Continue regular exercise    #CF Sinus disease  Symptoms fairly well-controlled on Rhinocort as needed.  Refill this today.    # EtOH and Tobacco Use via vaping  - Counseled patient on cutting back and especially avoiding all inhalational drugs    #CFTR Modulator  Previously on Trikafta but symptoms are minimal and he did not notice much of a change while taking this. Started in 3/2020 and has been off for at least 1 year.  Can discuss initiation in the future if he is willing or if symptoms change; would need regular lab monitoring.     # Maintenance   - Immunization: Recommended COVID booster  which she did decline; he is agreeable to an influenza vaccine today  - Annual labs today, also needs a DEXA and a chest x-ray    RTC 3 months         Marcie Springer MD  Pulmonary, Allergy, Critical Care, and Sleep Medicine   [facility]   Pager: [phone]    This note was created using dictation software and may contain errors.  Please contact the creator for any clarifications that are needed.    I have spent 40  minutes on the day of the visit to review the chart, interview and examine the patient, review labs and imaging, formulate a plan, document and submit orders. Time documented is excluding time spent for PFT interpretation    Subjective:   Andrea is here today by himself.  His father is in the waiting room.  He is feeling well.  He does report a recent cold where he developed a cough that was productive of white or yellow sputum.  He says that most people in his family had the same cold.  He feels at his baseline currently.  Does not typically have a cough.  He does have chronic pain in his chest which he rates as a 2 out of 10.  This occurs sporadically and does not affect his breathing.  He is vesting every day with normal saline and albuterol nebs, Mucomyst.    He has been off Trikafta for about a year.  He did not notice a change when he was on it versus when he was not on it.  He says it also did cost quite a bit which is another reason he is off.  He continues to work [employer] which requires him to go up and down stairs with heavy equipment.  This is going well and he has no difficulties managing work.  He does regular exercise with cardio and lifting every day.    He does report an occasional headache which is very infrequent and he takes ibuprofen for it.  He does stay well-hydrated.  He recently did have ear plugging for couple days but has resolved.  He denies sinus drainage but occasionally gets it and uses Rhinocort.    He denies GI symptoms such as diarrhea or constipation, pain, nausea.   He has bowel movements regularly which are brown, no blood.  Normal urination.  He has had episodes of lightheadedness or dizziness if he is working on a really hot day.  He is not taking a multivitamin.    He typically drinks on weekends, several per night.  He did have 2 beers and 2 shots last night (Thursday).  He denies blackouts.  He does say that his family has a history of alcoholism.  He also continues to vape nicotine daily        Prior:  - URI symptoms 3 weeks ago. Cough, sneezing, rhinorrhea, sinus pressure, malaise, myalgia. No fevers or chills. Sx improving but still with white-yellow productive cough - worst in the morning and improves in the afternoon. Increased vest therapy to BID during this period. Also with belly pain and diarrhea but now resolved. +Sick contact (friend) with similar symptoms.    - Covid 19 winter 2021. Did not require hospitalization. Had temperatures of 104F. Main sx myalgia and HA, no difficult breathing.     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 Stools vary from 1 to 3x/day, can be foremd or loose depending on what he eats (greasy foods, beer). Good appetite.   Activity: Walk, bikes 5-10 miles couple times a week    Maintenance   Modulator: Trikafta started 3/2020. Has improved FEV1 and weight. Stopped 6/2021 because ran out of refills, did not notice any difference off the medication. Was restarted 8/2021, self dc'ed 1 month ago (running low/almost out). Poor compliance, 4-5 times weekly.   AW Clearance: Vest therapy 1x/day. Sometimes brings up  "phlegm  Bronchodilators: Albuterol neb 1x/day with vest therapy + QID prn   Mucolytics: None  Antibiotics Inh: None currently. Previously on UMAIR as a teen every other month   Antibiotics Oral: None  Vitamins: Infrequently      ROS: Denies fevers, chills, CP, SOB, abdominal pain, HA. 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   - Tobacco: Vapes THC and nicotine >10 times daily   - EtOH: 4 drinks daily   - Illicits: Smokes cannabis     Outpatient Medications: All listed under maintenance as above. No antihistamines or OTC vitamins.     Allergies:   Seasonal     Family Hx:   Cancer maternal side   Brother has CF      Physical Exam:   Ht 1.77 m (5' 9.69\")   Wt 86.5 kg (190 lb 11.2 oz)   BMI 27.61 kg/m    - Gen: Aox3, NAD   - HEENT: No LN. TM opaque. No nasal polyps. Mild nasal erythema/edema.  - CV: RRR, no m/r/g  - Lung: CTAB, no conversational dyspnea  - Abd: NTND, +BS  - Ext: No BLE swelling  - Skin: No major rashes or lesions  - Neuro: CN grossly intact     Labs: OSH labs reviewed 1/28/22    Images/Studies:   PFT 9/22/22   FEV1 4.49L/96%   FVC 5.48L/112%   FEV1/FVC 85%    PFT 8/16/21   FEV1 4.26L/91%   FVC 5.96L/106%   FEV1/FVC 83%    PFT 10/21/14   FEV1 3.1L/96%    FVC 3.95L/106%   FEV1/FVC 92%      PFTs: 12/29/2023-personally reviewed: Normal spirometry.  FEV1 is increased 230 ml compared to prior.    Absent        Again, thank you for allowing me to participate in the care of your patient.        Sincerely,        Marcie Springer MD  "

## 2023-12-29 NOTE — NURSING NOTE
"Andrea Mederos is a 23 year old year old who is being seen for Cystic Fibrosis (CF Follow up )      Medications reviewed and Vital signs taken.    Specimen Collection Type: Sputum    Order(s) placed: AFB (Acid Fast Bacilli) and CF Aerobic Bacterial    *IF AFB order placed - please enter \"PRIORITIZE AFB\" to order comments.       Lab Results   Component Value Date    ACIDFAST No acid fast bacilli seen 09/22/2022    ACIDFAST No acid fast bacilli seen 09/22/2022         No results found for: \"AFBSMS\"    Vitals were taken and medications were reconciled.     Beatriz Meade RMA  1:03 PM      "

## 2023-12-29 NOTE — PROGRESS NOTES
[facility]   CF Clinic   Andrea Mederos MRN: [id number]  Date of Service: 12/29/2023    HPI:  Andrea Mederos is a 23M with CF here for CF follow-up.     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 Overall healthy, no h/o frequent CF exacerbations, hospitalizations or need for abx (last time ~7 years ago). Was previously on UMAIR nebs as a teenager but not longer needed  - Symptoms minimal. At baseline no productive cough, some sinus drainage. Currently feeling well without significant cough or SOB.   -He denies symptoms consistent with exocrine or endocrine pancreatic dysfunction at this time.  Would ideally monitor oral glucose tolerance test yearly and we discussed this today.  Weight has been stable and is 27.61, within CFF goal.   - FEV1 slightly improved compared to 8/2021, no pulmonary concerns   - Vest therapy daily + Albuterol nebs daily, Mucomyst, saline neb.  - Continue regular exercise    #CF Sinus disease  Symptoms fairly well-controlled on Rhinocort as needed.  Refill this today.    # EtOH and Tobacco Use via vaping  - Counseled patient on cutting back and especially avoiding all inhalational drugs    #CFTR Modulator  Previously on Trikafta but symptoms are minimal and he did not notice much of a change while taking this. Started in 3/2020 and has been off for at least 1 year.  Can discuss initiation in the future if he is willing or if symptoms change; would need regular lab monitoring.     # Maintenance   - Immunization: Recommended COVID booster which she did decline; he is agreeable to an influenza vaccine today  - Annual labs today, also needs a DEXA and a chest x-ray    RTC 3 months         Marcie Springer MD  Pulmonary, Allergy, Critical Care, and Sleep Medicine   [facility]   Pager: [phone]    This note was created  using dictation software and may contain errors.  Please contact the creator for any clarifications that are needed.    I have spent 40  minutes on the day of the visit to review the chart, interview and examine the patient, review labs and imaging, formulate a plan, document and submit orders. Time documented is excluding time spent for PFT interpretation    Subjective:   Andrea is here today by himself.  His father is in the waiting room.  He is feeling well.  He does report a recent cold where he developed a cough that was productive of white or yellow sputum.  He says that most people in his family had the same cold.  He feels at his baseline currently.  Does not typically have a cough.  He does have chronic pain in his chest which he rates as a 2 out of 10.  This occurs sporadically and does not affect his breathing.  He is vesting every day with normal saline and albuterol nebs, Mucomyst.    He has been off Trikafta for about a year.  He did not notice a change when he was on it versus when he was not on it.  He says it also did cost quite a bit which is another reason he is off.  He continues to work [employer] which requires him to go up and down stairs with heavy equipment.  This is going well and he has no difficulties managing work.  He does regular exercise with cardio and lifting every day.    He does report an occasional headache which is very infrequent and he takes ibuprofen for it.  He does stay well-hydrated.  He recently did have ear plugging for couple days but has resolved.  He denies sinus drainage but occasionally gets it and uses Rhinocort.    He denies GI symptoms such as diarrhea or constipation, pain, nausea.  He has bowel movements regularly which are brown, no blood.  Normal urination.  He has had episodes of lightheadedness or dizziness if he is working on a really hot day.  He is not taking a multivitamin.    He typically drinks on weekends, several per night.  He did have 2 beers and 2  shots last night (Thursday).  He denies blackouts.  He does say that his family has a history of alcoholism.  He also continues to vape nicotine daily        Prior:  - URI symptoms 3 weeks ago. Cough, sneezing, rhinorrhea, sinus pressure, malaise, myalgia. No fevers or chills. Sx improving but still with white-yellow productive cough - worst in the morning and improves in the afternoon. Increased vest therapy to BID during this period. Also with belly pain and diarrhea but now resolved. +Sick contact (friend) with similar symptoms.    - Covid 19 winter 2021. Did not require hospitalization. Had temperatures of 104F. Main sx myalgia and HA, no difficult breathing.     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 Stools vary from 1 to 3x/day, can be foremd or loose depending on what he eats (greasy foods, beer). Good appetite.   Activity: Walk, bikes 5-10 miles couple times a week    Maintenance   Modulator: Trikafta started 3/2020. Has improved FEV1 and weight. Stopped 6/2021 because ran out of refills, did not notice any difference off the medication. Was restarted 8/2021, self dc'ed 1 month ago (running low/almost out). Poor compliance, 4-5 times weekly.   AW Clearance: Vest therapy 1x/day. Sometimes brings up phlegm  Bronchodilators: Albuterol neb 1x/day with vest therapy + QID prn   Mucolytics: None  Antibiotics Inh: None currently. Previously on UMAIR as a teen every other month   Antibiotics Oral: None  Vitamins: Infrequently      ROS: Denies fevers, chills, CP, SOB, abdominal pain, HA. 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   - Tobacco: Vapes THC and nicotine >10 times daily   - EtOH: 4 drinks daily   - Illicits: Smokes cannabis     Outpatient Medications: All listed under maintenance as above. No antihistamines or OTC vitamins.     Allergies:   Seasonal     Family Hx:   Cancer maternal side   Brother has CF      Physical Exam:   Ht 1.77 m (5' 9.69\")   Wt 86.5 kg (190 lb 11.2 oz)   BMI 27.61 kg/m    - Gen: Aox3, NAD   - HEENT: No LN. TM opaque. No nasal polyps. Mild nasal erythema/edema.  - CV: RRR, no m/r/g  - Lung: CTAB, no conversational dyspnea  - Abd: NTND, +BS  - Ext: No BLE swelling  - Skin: No major rashes or lesions  - Neuro: CN grossly intact     Labs: OSH labs reviewed 1/28/22    Images/Studies:   PFT 9/22/22   FEV1 4.49L/96%   FVC 5.48L/112%   FEV1/FVC 85%    PFT 8/16/21   FEV1 4.26L/91%   FVC 5.96L/106%   FEV1/FVC 83%    PFT 10/21/14   FEV1 3.1L/96%    FVC 3.95L/106%   FEV1/FVC 92%      PFTs: 12/29/2023-personally reviewed: Normal spirometry.  FEV1 is increased 230 ml compared to prior.    Absent      "

## 2024-01-01 LAB
A-TOCOPHEROL VIT E SERPL-MCNC: 11.7 MG/L
ANNOTATION COMMENT IMP: NORMAL
BETA+GAMMA TOCOPHEROL SERPL-MCNC: 1.6 MG/L
RETINYL PALMITATE SERPL-MCNC: 0.02 MG/L
VIT A SERPL-MCNC: 0.94 MG/L

## 2024-01-02 LAB — IGG SERPL-MCNC: 855 MG/DL (ref 610–1616)

## 2024-01-03 LAB
BACTERIA SPT CULT: ABNORMAL
BACTERIA SPT CULT: ABNORMAL
IGE SERPL-ACNC: 38 KU/L (ref 0–114)

## 2024-01-04 LAB — TESTOST SERPL-MCNC: 451 NG/DL (ref 240–950)

## 2024-01-04 NOTE — PATIENT INSTRUCTIONS
Cystic Fibrosis Self-Care Plan    RECOMMENDATIONS:   Help us provide the best possible care. If you receive a questionnaire from the CF Foundation about your clinic experience today please fill it out.  It should take less than 5 minutes. Let us know what we are doing well and how we can improve.    Andrea,     It was good to see you. I'm glad to hear you are feeling well!    We discussed the following:  -Lab work today  -Continue current therapies; increase vesting if you start to feel sick  -Continue Rhinocort for nasal symptoms as needed; a refill was sent  -Influenza vaccine today    YOUR GOAL: Have a good winter and stay healthy!      Cystic Fibrosis :    Liliane Fisher  [phone]  [place] [facility] Nurse line:  Johanny FARRELL   [phone]     [facility] Fax Number:      [phone]         Cystic Fibrosis Respiratory Therapists:   Raya Carter                          [phone]          Rossy Dodson   [phone]  Cystic Fibrosis Dietitians:              Tania Obregon              [phone]                            Estee Skinner                        [phone]   Cystic Fibrosis Diabetes Nurse:    Nessa Johnson               [phone]    Cystic Fibrosis Social Workers:     Kristine Dumont              [phone]                     Enma Moore               [phone]  Cystic Fibrosis Pharmacists:           Soco Calderon                              [phone] (pager)         Krupa Maradiaga      [phone]   Cystic Fibrosis Genetic Counselor:   Smita Chou    [phone]    [place] [facility] website:  [email or web address]    We have recently learned about an albuterol neb solution shortage due to a manufacturing delay. There is still a small supply coming in but not enough to meet the current demand. We do not yet have an estimate for when this will become widely available again.    We our asking our CF community to do  the following:    Please take time to check your supply of albuterol neb solution at home. We recommend keeping at least a 2-week supply of albuterol nebs at home in case of illness.    2.  If you have an albuterol inhaler at home, you can use 4 puffs of the inhaler with a spacer in place of the nebulized albuterol at the start of your treatments. It is important to use a spacer for the best technique. If you do not have a spacer at home or have questions on technique, we will be happy to review and send one to your home address. Please also take a moment to check that your albuterol HFA inhaler is available and not .  inhalers may be less effective as the medication loses its potency or power. In some instances your team may suggest another alternative instead of an albuterol inhaler.    3.  Please take care in requesting refills. Albuterol neb solution is a life-saving medication for patients having severe asthma attacks and other emergency respiratory conditions. Let s work together to make sure that albuterol neb solution is available to those who need it urgently.    Reach out to your care team with questions and to confirm your planned alternative for albuterol nebs. [employer] will be the fastest way to connect. If possible, please reserve the nursing line for more urgent concerns as we are short on nursing staff.    Here are some reputable sites with more information:    https://www.cidrap.[facility]/resilient-drug-supply/us-liquid-albuterol-ihogidwq-xgepevfi-lkwprk-after-major-supplier-shuts-down    https://www.[employer]//health/albuterol-shortage    https://www.ashp.org/drug-shortages/current-shortages/drug-shortage-detail.aspx?oo=172&loginreturnUrl=SSOCheckOnly       MRN: [id number]   Clinic Date: 2024   Patient: Andrea Mederos     Annual Studies:   IGG   Date Value Ref Range Status   10/28/2013 788 695 - 1,620 mg/dL Final     Immunoglobulin G   Date Value Ref Range Status    12/29/2023 855 610 - 1,616 mg/dL Final     Insulin   Date Value Ref Range Status   10/28/2013 81 mU/L Final     Comment:     Reference Range:  0-20     There are no preventive care reminders to display for this patient.    Pulmonary Function Tests  FEV1: amount of air you can blow out in 1 second  FVC: total amount of air you can take in and blow out    Your Goals:             Latest Ref Rng & Units 12/29/2023    12:42 PM   PFT   FVC L 6.08  P   FEV1 L 4.72  P   FVC% % 119  P   FEV1% % 108  P      P Preliminary result          Airway Clearance: The Most Important Way to Keep Your Lungs Healthy  Vest Settings:   Hill-Rom Frequencies: 8, 9, 10 Pressure 10 Then, Frequencies 18, 19, 20 Pressure 6     [employer]: Quick Start with Pressure of     Do each frequency for 5 minutes; Deflate vest after each frequency & cough 3 times before beginning the next setting.    Vest and Neb Therapy should be done 1-2 times/day.    Good Nutrition Can Improve Lung Function and Overall Health    Take ALL of your vitamins with food    Take 1/2 of your enzymes before EVERY meal/snack and the other 1/2 mid-meal/snack    Wt Readings from Last 3 Encounters:   12/29/23 86.5 kg (190 lb 11.2 oz)   09/22/22 87.2 kg (192 lb 3.9 oz)   04/15/14 50.4 kg (111 lb 1.8 oz) (59%, Z= 0.22)*     * Growth percentiles are based on CDC (Boys, 2-20 Years) data.       Body mass index is 27.61 kg/m .         National CF Foundation Recommendations for BMI in CF Adults: Women: at least 22 Men: at least 23        Controlling Blood Sugars Helps Prevent Lung Infections & Improves Nutrition  Test blood sugar:    In the morning before eating (goal is )    2 hours after a meal (goal is less than 150)    When pre-meal glucose is greater than 150 add correction    At bedtime (if less than 100 eat a snack with 15 grams of carbohydrates  Last A1C Results:   Hemoglobin A1C   Date Value Ref Range Status   12/29/2023 5.1 <5.7 % Final     Comment:     Normal <5.7%    Prediabetes 5.7-6.4%    Diabetes 6.5% or higher     Note: Adopted from ADA consensus guidelines.   10/28/2013 5.2 4.3 - 6.0 % Final         If diabetic, measure A1C every 6 months. Goal: Under 7%    Staying Healthy  Research:  If you are interested in learning about research opportunities or have questions, please contact the CF Research Team at [phone] or [email or web address].    CF Foundation:  Compass is a personalized resource service to help you with the insurance, financial, legal and other issues you are facing.  It's free, confidential and available to anyone with CF.  Ask your  for more information or contact Compass directly at 324-COMPASS ([phone]) or compass@cff.org, or learn more at cff.org/compass.

## 2024-01-12 ENCOUNTER — TELEPHONE (OUTPATIENT)
Dept: PULMONOLOGY | Facility: CLINIC | Age: 24
End: 2024-01-12
Payer: COMMERCIAL

## 2024-01-12 DIAGNOSIS — R74.8 ELEVATED LIVER ENZYMES: ICD-10-CM

## 2024-01-12 DIAGNOSIS — E84.9 CF (CYSTIC FIBROSIS) (H): Primary | ICD-10-CM

## 2024-01-12 NOTE — TELEPHONE ENCOUNTER
Patient had elevated GGT and liver enzyme on labs. Plan per Dr. Springer as follows:      1. Order an abdominal US to evaluate his liver   2. Send him to GI for evaluation for CFLD - may consider initiation of ursodiol but I'd like him evaluated first.   3. Repeat labs in 4-6 weeks and encourage him to not drink prior to this     RN called patient. Patient had declined to proceed with US and referral. He would like to repeat labs first. Understands that if they continue to be elevated, we recommend proceeding again with imaging and referral. Unclear how far liver team is booking out. Patient aware of this. Encouraged patient to hydrate, avoid tylenol, and abstain from alcohol prior. Patient verbalized understanding.    JUAN R Roman

## 2024-04-01 ENCOUNTER — TELEPHONE (OUTPATIENT)
Dept: PULMONOLOGY | Facility: CLINIC | Age: 24
End: 2024-04-01
Payer: COMMERCIAL

## 2024-04-01 DIAGNOSIS — E84.9 CF (CYSTIC FIBROSIS) (H): Primary | ICD-10-CM

## 2024-04-01 DIAGNOSIS — E84.9 CYSTIC FIBROSIS EXACERBATION (H): ICD-10-CM

## 2024-04-01 RX ORDER — DOXYCYCLINE HYCLATE 100 MG
100 TABLET ORAL 2 TIMES DAILY
Qty: 42 TABLET | Refills: 0 | Status: SHIPPED | OUTPATIENT
Start: 2024-04-01

## 2024-04-01 RX ORDER — DOXYCYCLINE HYCLATE 100 MG
100 TABLET ORAL 2 TIMES DAILY
Qty: 42 TABLET | Refills: 0 | Status: SHIPPED | OUTPATIENT
Start: 2024-04-01 | End: 2024-04-01

## 2024-04-01 NOTE — TELEPHONE ENCOUNTER
"[facility]  April 1, 2024    Lynn Masters    Cystic fibrosis Provider:  Dr Marcie Springer    Caller: FatherRyan    Clinical information:  Andrea Mederos's father reports that Andrea has a \"hacky cough\" that sounds like it is getting worse and wet sounding. Father hearing slight wheeze. Cough is frequent.  Has been going on for about a month. Was still active until about a week ago when he slowed down. Appetite remains good. No fevers. Father thinks he is sleeping thru the night, but is complaining of feeling tired in the AM.  Vest/neb x1 a day. Has quit vaping and smoking about a month ago. Last culture grew staph aureus.    Plan:   Discussed with Dr Springer:  Start course of Doxycycline 100 mg BID x3 weeks.  Increase vest/nebs to twice daily.  RTC in the next 2-3 weeks.  Call back with any new or worsening symptoms/concerns.    Voicemail message left on father's phone with above message. Return call to writer requested.    "

## 2024-04-01 NOTE — TELEPHONE ENCOUNTER
ADDENDUM:    Father did acknowledge message left and expressed understanding and agreement to plan.

## 2024-04-01 NOTE — PROGRESS NOTES
Per verbal order by Dr. Springer.    Krupa Maradiaga, PharmD  Cystic Fibrosis MTM Pharmacist  [facility] [place]  Voicemail: [phone]

## 2024-04-24 DIAGNOSIS — E84.9 CF (CYSTIC FIBROSIS) (H): ICD-10-CM

## 2024-04-24 RX ORDER — SODIUM CHLORIDE FOR INHALATION 0.9 %
VIAL, NEBULIZER (ML) INHALATION
Qty: 180 ML | Refills: 6 | Status: SHIPPED | OUTPATIENT
Start: 2024-04-24

## 2025-01-21 ENCOUNTER — TELEPHONE (OUTPATIENT)
Dept: PULMONOLOGY | Facility: CLINIC | Age: 25
End: 2025-01-21
Payer: COMMERCIAL

## 2025-01-21 NOTE — TELEPHONE ENCOUNTER
Writer called patient back after voicemail left on [facility] RN triage line. Writer unable to understand patient's voicemail due to background noise. Left voicemail for patient with callback number.     RUTH Denise, RN  RN Care Coordinator [facility]

## 2025-01-22 DIAGNOSIS — E84.9 CF (CYSTIC FIBROSIS) (H): ICD-10-CM

## 2025-01-22 RX ORDER — ALBUTEROL SULFATE 0.83 MG/ML
SOLUTION RESPIRATORY (INHALATION)
Qty: 540 ML | Refills: 3 | Status: CANCELLED | OUTPATIENT
Start: 2025-01-22

## 2025-01-22 NOTE — TELEPHONE ENCOUNTER
Writer called patient to discuss refill request. Patient was last seen in [facility] clinic December 2023. Patient has no follow up appointment made. Writer discussed need for follow up visit in order to continue receiving prescriptions and medications from our clinic. Writer offered to transfer patient to [place] to assist with making a follow up appointment. Patient declined saying he was busy and would call us back later.     RUTH Denise, RN  RN Care Coordinator [facility]

## 2025-01-27 ENCOUNTER — TELEPHONE (OUTPATIENT)
Dept: PULMONOLOGY | Facility: CLINIC | Age: 25
End: 2025-01-27
Payer: COMMERCIAL

## 2025-01-27 NOTE — TELEPHONE ENCOUNTER
Message left by Father Ryan, inquiring about refill for his son Andrea.  Returned call to Ryan, message left.  In message noted that my co-worker Maddie had spoken with Andrea last week. Andrea needs to get scheduled for provider follow-up before refills can be initiated.  Provided direct call back number to Liliane for scheduling    MIKE tSubbsN, RN  RN Care Coordinator [facility]